# Patient Record
Sex: FEMALE
[De-identification: names, ages, dates, MRNs, and addresses within clinical notes are randomized per-mention and may not be internally consistent; named-entity substitution may affect disease eponyms.]

---

## 2024-07-18 ENCOUNTER — DASHBOARD ENCOUNTERS (OUTPATIENT)
Age: 34
End: 2024-07-18

## 2024-07-22 ENCOUNTER — OFFICE VISIT (OUTPATIENT)
Dept: URBAN - METROPOLITAN AREA CLINIC 105 | Facility: CLINIC | Age: 34
End: 2024-07-22

## 2024-07-22 RX ORDER — AMOXICILLIN AND CLAVULANATE POTASSIUM 875; 125 MG/1; MG/1
TABLET, COATED ORAL
Qty: 20 TABLET | Status: ACTIVE | COMMUNITY

## 2024-07-22 RX ORDER — AMOXICILLIN AND CLAVULANATE POTASSIUM 875; 125 MG/1; MG/1
TAKE ONE TABLET BY MOUTH EVERY 12 HOURS FOR 10 DAYS TABLET, COATED ORAL
Qty: 20 UNSPECIFIED | Refills: 0 | Status: ACTIVE | COMMUNITY

## 2024-07-22 RX ORDER — ERGOCALCIFEROL 1.25 MG/1
TAKE ONE CAPSULE BY MOUTH EVERY WEEK CAPSULE, LIQUID FILLED ORAL
Qty: 4 UNSPECIFIED | Refills: 2 | Status: ACTIVE | COMMUNITY

## 2024-07-22 RX ORDER — ERGOCALCIFEROL 1.25 MG/1
CAPSULE, LIQUID FILLED ORAL
Qty: 4 CAPSULE | Status: ACTIVE | COMMUNITY

## 2024-07-22 NOTE — HPI-TODAY'S VISIT:
34-year-old female with PMH of Alvarez syndrome, psoriasis, and vit D deficiency, referred by Dr. Mirian Rodríguez for evaluation of epigastric and LLQ pain. A copy of this note will be sent to referring provider. Per PCP note in 4/2024, pt reported history of diverticulitis. Labs and CT scan ordered, but CT not done. Pt was recommended to start on Augmentin at that time.  Labs 4/15/24 - WBC 12.4 with elevated neutrophils, CBC otherwise normal. Glucose 195, CMP otherwise normal. Lipase, TSH normal. Hep C negative.  [place]

## 2024-08-12 ENCOUNTER — OFFICE VISIT (OUTPATIENT)
Dept: URBAN - METROPOLITAN AREA CLINIC 105 | Facility: CLINIC | Age: 34
End: 2024-08-12

## 2024-08-12 RX ORDER — ERGOCALCIFEROL 1.25 MG/1
CAPSULE, LIQUID FILLED ORAL
Qty: 4 CAPSULE | COMMUNITY

## 2024-08-12 RX ORDER — AMOXICILLIN AND CLAVULANATE POTASSIUM 875; 125 MG/1; MG/1
TABLET, COATED ORAL
Qty: 20 TABLET | COMMUNITY

## 2024-08-12 RX ORDER — AMOXICILLIN AND CLAVULANATE POTASSIUM 875; 125 MG/1; MG/1
TAKE ONE TABLET BY MOUTH EVERY 12 HOURS FOR 10 DAYS TABLET, COATED ORAL
Qty: 20 UNSPECIFIED | Refills: 0 | COMMUNITY

## 2024-08-12 RX ORDER — ERGOCALCIFEROL 1.25 MG/1
TAKE ONE CAPSULE BY MOUTH EVERY WEEK CAPSULE, LIQUID FILLED ORAL
Qty: 4 UNSPECIFIED | Refills: 2 | COMMUNITY

## 2024-08-19 ENCOUNTER — OFFICE VISIT (OUTPATIENT)
Dept: URBAN - METROPOLITAN AREA CLINIC 105 | Facility: CLINIC | Age: 34
End: 2024-08-19
Payer: COMMERCIAL

## 2024-08-19 VITALS
HEART RATE: 88 BPM | DIASTOLIC BLOOD PRESSURE: 93 MMHG | SYSTOLIC BLOOD PRESSURE: 141 MMHG | WEIGHT: 150 LBS | BODY MASS INDEX: 30.24 KG/M2 | TEMPERATURE: 97.3 F | HEIGHT: 59 IN

## 2024-08-19 DIAGNOSIS — R10.32 ABDOMINAL PAIN, LLQ: ICD-10-CM

## 2024-08-19 DIAGNOSIS — Z87.19 HISTORY OF DIVERTICULITIS: ICD-10-CM

## 2024-08-19 DIAGNOSIS — R19.7 INTERMITTENT DIARRHEA: ICD-10-CM

## 2024-08-19 DIAGNOSIS — R10.13 ABDOMINAL PAIN, EPIGASTRIC: ICD-10-CM

## 2024-08-19 PROCEDURE — 99244 OFF/OP CNSLTJ NEW/EST MOD 40: CPT | Performed by: INTERNAL MEDICINE

## 2024-08-19 PROCEDURE — 99204 OFFICE O/P NEW MOD 45 MIN: CPT | Performed by: INTERNAL MEDICINE

## 2024-08-19 RX ORDER — ERGOCALCIFEROL 1.25 MG/1
TAKE ONE CAPSULE BY MOUTH EVERY WEEK CAPSULE, LIQUID FILLED ORAL
Qty: 4 UNSPECIFIED | Refills: 2 | Status: ACTIVE | COMMUNITY

## 2024-08-19 RX ORDER — AMOXICILLIN AND CLAVULANATE POTASSIUM 875; 125 MG/1; MG/1
TAKE ONE TABLET BY MOUTH EVERY 12 HOURS FOR 10 DAYS TABLET, COATED ORAL
Qty: 20 UNSPECIFIED | Refills: 0 | Status: ON HOLD | COMMUNITY

## 2024-08-19 RX ORDER — AMOXICILLIN AND CLAVULANATE POTASSIUM 875; 125 MG/1; MG/1
TABLET, COATED ORAL
Qty: 20 TABLET | Status: ON HOLD | COMMUNITY

## 2024-08-19 RX ORDER — ERGOCALCIFEROL 1.25 MG/1
CAPSULE, LIQUID FILLED ORAL
Qty: 4 CAPSULE | Status: ON HOLD | COMMUNITY

## 2024-08-19 RX ORDER — METFORMIN HYDROCHLORIDE 500 MG/1
TAKE ONE TABLET BY MOUTH ONE TIME DAILY WITH BREAKFAST TABLET, EXTENDED RELEASE ORAL
Qty: 30 UNSPECIFIED | Refills: 0 | Status: ACTIVE | COMMUNITY

## 2024-08-19 NOTE — HPI-TODAY'S VISIT:
34-year-old female with PMH of Alvarez syndrome, psoriasis, and vit D deficiency, referred by Dr. Mirian Rodríguez for evaluation of epigastric and LLQ pain. A copy of this note will be sent to referring provider. Per PCP note in 4/2024, pt reported history of diverticulitis. Labs and CT scan ordered, but CT not done. Pt was recommended to start on Augmentin at that time.  Today, pt states she has been told she has diverticulitis and gets flares of that every once in a while. Last was in 4/2024 dx by PCP. She states that her PCP wants her to be seen by a GI for evaluation. States she never had imaging to confirm diverticulitis. She has periodically taken abx for clinically diagnosed episodes and symptoms will improve with this. She notes intermittent bloating and abdominal pain, relatively infrequent. She admits that she has back pain issues as well, that will sometimes radiate to the front of the abdomen.  She admits to occasional diarrhea 3-4x/week - describes as more frequent stools in those days and bowel urgency. Sometimes watery and sometimes mushy. Denies blood in stool. Denies constipation or skipping days without BM.  On other days, BM habits are "normal" - having 2-3 BMs/day of formed stool.  Denies N/V, heartburn, acid reflux.  Denies known history of GI cancers. Has never had a colonoscopy before.   Labs 4/15/24 - WBC 12.4 with elevated neutrophils, CBC otherwise normal. Glucose 195, CMP otherwise normal. Lipase, TSH normal. Hep C negative.

## 2024-10-07 ENCOUNTER — OFFICE VISIT (OUTPATIENT)
Dept: URBAN - METROPOLITAN AREA CLINIC 105 | Facility: CLINIC | Age: 34
End: 2024-10-07